# Patient Record
(demographics unavailable — no encounter records)

---

## 2025-01-24 NOTE — END OF VISIT
Tolerated hydration well  Discharged in stable condition   Declined AVS
[Time Spent: ___ minutes] : I have spent [unfilled] minutes of time on the encounter which excludes teaching and separately reported services.
[Time Spent: ___ minutes] : I have spent [unfilled] minutes of time on the encounter which excludes teaching and separately reported services.

## 2025-01-27 NOTE — ASSESSMENT
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 Lau  Will continue routine follow up.  Plan: Continue Follow Up with Dr. Lau [facility] Head wo in 6/2025 Follow Up with Me After Imaging

## 2025-01-27 NOTE — HISTORY OF PRESENT ILLNESS
[FreeTextEntry1] : Mr. Del Toro is a pleasant 66yo female who presents today for hospital follow up.  He presented to the [facility] ED on 12/4 with headache and right eye 3rd nerve palsy.  During evaluation, he was found to have a right Pcomm artery aneurysm.  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 Calderón.  He was also found to have a small right paraophthalmic unruptured aneurysm.  He was discharged on 12/15/24.  Today, he reports feeling well.  He continues to have the right 3rd nerve palsy, under the care of Dr. Lau.  No other neurological complaints.

## 2025-01-27 NOTE — HISTORY OF PRESENT ILLNESS
[FreeTextEntry1] : Mr. Del Toro is a pleasant 68yo female who presents today for hospital follow up.  He presented to the [place] ED on 12/4 with headache and right eye 3rd nerve palsy.  During evaluation, he was found to have a right Pcomm artery aneurysm.  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 Calderón.  He was also found to have a small right paraophthalmic unruptured aneurysm.  He was discharged on 12/15/24.  Today, he reports feeling well.  He continues to have the right 3rd nerve palsy, under the care of Dr. Lau.  No other neurological complaints.

## 2025-01-27 NOTE — PHYSICAL EXAM
[Eyes Do Not Open Symmetrically] : the eyes do not open symmetrically [Ptosis Right] : ptosis of the right eye was noted [Extraocular Muscle Motility Restricted Right Eye] : extraoccular movements of the right eye were restricted [General Appearance - Alert] : alert [General Appearance - In No Acute Distress] : in no acute distress [Oriented To Time, Place, And Person] : oriented to person, place, and time [Person] : oriented to person [Place] : oriented to place [Time] : oriented to time [Motor Tone] : muscle tone was normal in all four extremities [Intact] : all motor groups within normal limits of strength and tone bilaterally [Pupil Nonreactive To Light - Direct, Right Only] : does not react to light [Outer Ear] : the ears and nose were normal in appearance [Neck Appearance] : the appearance of the neck was normal [] : no respiratory distress [Abnormal Walk] : normal gait [Skin Color & Pigmentation] : normal skin color and pigmentation